# Patient Record
Sex: FEMALE | Race: WHITE | NOT HISPANIC OR LATINO | Employment: UNEMPLOYED | URBAN - NONMETROPOLITAN AREA
[De-identification: names, ages, dates, MRNs, and addresses within clinical notes are randomized per-mention and may not be internally consistent; named-entity substitution may affect disease eponyms.]

---

## 2023-01-11 ENCOUNTER — DOCUMENTATION (OUTPATIENT)
Dept: FAMILY MEDICINE CLINIC | Facility: CLINIC | Age: 83
End: 2023-01-11
Payer: MEDICARE

## 2023-01-11 RX ORDER — TRAMADOL HYDROCHLORIDE 50 MG/1
50 TABLET ORAL EVERY 6 HOURS PRN
COMMUNITY
End: 2023-01-11 | Stop reason: SDUPTHER

## 2023-01-11 RX ORDER — TRAMADOL HYDROCHLORIDE 50 MG/1
50 TABLET ORAL EVERY 6 HOURS PRN
Qty: 60 TABLET | Refills: 0 | Status: SHIPPED | OUTPATIENT
Start: 2023-01-11

## 2023-01-11 NOTE — TELEPHONE ENCOUNTER
(NEW ADMIT)    JANICE REQUESTING MED REFILL FOR TRAMADOL 50 MG.    DIRECTIONS: TRAMADOL 50 MG 1 TAB PO Q 6 HRS PRN.

## 2023-01-12 ENCOUNTER — NURSING HOME (OUTPATIENT)
Dept: INTERNAL MEDICINE | Facility: CLINIC | Age: 83
End: 2023-01-12
Payer: MEDICARE

## 2023-01-12 VITALS
TEMPERATURE: 97 F | DIASTOLIC BLOOD PRESSURE: 72 MMHG | OXYGEN SATURATION: 96 % | RESPIRATION RATE: 20 BRPM | WEIGHT: 217 LBS | SYSTOLIC BLOOD PRESSURE: 125 MMHG | HEART RATE: 90 BPM

## 2023-01-12 DIAGNOSIS — E03.9 ACQUIRED HYPOTHYROIDISM: ICD-10-CM

## 2023-01-12 DIAGNOSIS — R21 GROIN RASH: ICD-10-CM

## 2023-01-12 DIAGNOSIS — S99.912D INJURY OF LEFT ANKLE, SUBSEQUENT ENCOUNTER: ICD-10-CM

## 2023-01-12 DIAGNOSIS — I10 ESSENTIAL HYPERTENSION: ICD-10-CM

## 2023-01-12 DIAGNOSIS — L03.115 CELLULITIS OF RIGHT LOWER EXTREMITY: Primary | ICD-10-CM

## 2023-01-12 DIAGNOSIS — N18.31 STAGE 3A CHRONIC KIDNEY DISEASE: ICD-10-CM

## 2023-01-12 DIAGNOSIS — N30.00 ACUTE CYSTITIS WITHOUT HEMATURIA: ICD-10-CM

## 2023-01-12 PROCEDURE — 99306 1ST NF CARE HIGH MDM 50: CPT | Performed by: INTERNAL MEDICINE

## 2023-01-12 NOTE — LETTER
Nursing Home History and Physical       Rodriguez Umaña DO  [address] [place] [address] Phone: [phone]  Fax: [phone]     PATIENT NAME: Marin Aguirre                                                                          YOB: 1940           DATE OF SERVICE: 01/12/2023  FACILITY:  [facility]    CHIEF COMPLAINT:  Nursing facility admission      HISTORY OF PRESENT ILLNESS:   Patient is a pleasant 82-year-old Syriac female who had been living by herself prior to recent hospitalization.  She has a history of obesity, COPD, syncope, type 2 diabetes mellitus, and hypertension and also suffered a fall resulting in left ankle fracture.  Ankle has been placed in a splint and patient has had limited ability to transfer and move.  Upon admission to the hospital, patient was found to have elevated creatinine, mildly elevated lactic acid, and low magnesium.  She was treated with supportive care and also treated for UTI.    The patient has transferred to this facility for strengthening and rehab as she was unable to care for herself at home.  Patient shared significant concerns about groin pain and raw areas around her vagina.  Every time she urinates, she reports having significant burning pain and worsening of her wound as the area remains wet.  Yesterday, she was started on prescription of antifungal cream and Diflucan for fungal infection.  Lidocaine topical was also started for pain control.  Patient also has increased anxiety and depression for which Lexapro was started yesterday.    PAST MEDICAL & SURGICAL HISTORY:   Past Medical History:   Diagnosis Date   • KRISTIAN (acute kidney injury) (HCC)    • Atrial fibrillation (HCC)    • Cellulitis     RIGHT LOWER EXT   • CKD (chronic kidney disease)    • COPD (chronic obstructive pulmonary disease) ([facility])    • Diabetes mellitus (HCC)    • Elevated lactic acid level    • Frequent falls    • Hypertension    • Hypothyroidism    • Obesity    • Sepsis  ([facility])    • Syncope    • Tibial fracture       Past Surgical History:   Procedure Laterality Date   • PACEMAKER IMPLANTATION N/A          MEDICATIONS:  I have reviewed and reconciled the patients medication list in the patients chart at the skilled nursing facility on 01/12/2023.      ALLERGIES:  Allergies   Allergen Reactions   • Midazolam Unknown - High Severity   • Oxycodone Unknown - High Severity         SOCIAL HISTORY:  Social History     Socioeconomic History   • Marital status:    Tobacco Use   • Smoking status: Never   • Smokeless tobacco: Never   Vaping Use   • Vaping Use: Never used   Substance and Sexual Activity   • Alcohol use: Never   • Drug use: Never   • Sexual activity: Defer       FAMILY HISTORY:  Family History   Problem Relation Age of Onset   • COPD Mother         REVIEW OF SYSTEMS:  Review of Systems   Constitutional: Positive for fatigue. Negative for chills and fever.   HENT: Negative for congestion, ear pain, rhinorrhea, sinus pressure and sore throat.    Eyes: Negative for visual disturbance.   Respiratory: Negative for cough, chest tightness, shortness of breath and wheezing.    Cardiovascular: Negative for chest pain, palpitations and leg swelling.   Gastrointestinal: Negative for abdominal pain, blood in stool, constipation, diarrhea, nausea and vomiting.   Endocrine: Negative for polydipsia and polyuria.   Genitourinary: Positive for difficulty urinating, dysuria and genital sores. Negative for hematuria.   Musculoskeletal: Negative for arthralgias and back pain.   Skin: Negative for rash.   Neurological: Negative for dizziness, light-headedness, numbness and headaches.   Psychiatric/Behavioral: Negative for dysphoric mood and sleep disturbance. The patient is not nervous/anxious.          PHYSICAL EXAMINATION:   VITAL SIGNS: /72   Pulse 90   Temp 97 °F (36.1 °C)   Resp 20   Wt 98.4 kg (217 lb)   SpO2 96%     Physical Exam  Vitals and nursing note reviewed.    Constitutional:       General: She is in acute distress (mild due to persistent groin pain).      Appearance: Normal appearance. She is well-developed. She is obese.   HENT:      Head: Normocephalic and atraumatic.      Nose: Nose normal.      Mouth/Throat:      Mouth: Mucous membranes are moist.      Pharynx: No oropharyngeal exudate.   Eyes:      General: No scleral icterus.     Conjunctiva/sclera: Conjunctivae normal.      Pupils: Pupils are equal, round, and reactive to light.   Neck:      Thyroid: No thyromegaly.   Cardiovascular:      Rate and Rhythm: Normal rate and regular rhythm.      Heart sounds: Normal heart sounds. No murmur heard.    No friction rub. No gallop.   Pulmonary:      Effort: Pulmonary effort is normal. No respiratory distress.      Breath sounds: Normal breath sounds. No wheezing.   Abdominal:      General: Bowel sounds are normal. There is no distension.      Palpations: Abdomen is soft.      Tenderness: There is no abdominal tenderness.   Musculoskeletal:         General: No deformity or signs of injury.      Cervical back: Normal range of motion and neck supple.      Comments: Left lower extremity in splint   Lymphadenopathy:      Cervical: No cervical adenopathy.   Skin:     General: Skin is warm and dry.      Findings: No rash.      Comments: Blistering red and raw lesions in the groin   Neurological:      Mental Status: She is alert and oriented to person, place, and time.   Psychiatric:         Mood and Affect: Mood normal.         Behavior: Behavior normal.         RECORDS REVIEW:   Discharge Summary from [facility] 1/10/2023    ASSESSMENT   Diagnoses and all orders for this visit:    1. Cellulitis of right lower extremity (Primary)    2. Injury of left ankle, subsequent encounter    3. Acute cystitis without hematuria    4. Groin rash    5. Essential hypertension    6. Stage 3a chronic kidney disease (HCC)    7. Acquired hypothyroidism        PLAN  Candidal groin  infection/wounds/sores to groin  - I agree with recent plans for antifungal topical as well as oral Diflucan.  - Due to significant pain with urination, I recommended the patient keep a Patino catheter in place for about 3 to 5 days to allow for time to heal and keep the groin dry as she is incontinent.    Right lower extremity cellulitis  - Completed antibiotics during hospitalization.    Anxiety/depression  - Recently started on Lexapro.  Appears to be handling the medication so far but it is too early to make any adjustments.    Left ankle injury  - Currently nonweightbearing to the extremity.  Physical therapy continues to work with the patient for mobility.    CKD stage IIIa  - Renal function remained stable at this time.        [x]  Discussed Patient in detail with nursing/staff, addressed all needs today.     [x]  Plan of Care Reviewed   [x]  PT/OT Reviewed   [x]  Order Changes  []  Discharge Plans Reviewed  [x]  Advance Directive on file with Nursing Home.   [x]  POA on file with Nursing Home.    [x]  Code Status listed and reviewed.       Rodriguez Umaña DO.  1/15/2023      **Part of this note may be an electronic transcription/translation of spoken language to printed text using the Dragon Dictation System.**

## 2023-01-15 NOTE — PROGRESS NOTES
Nursing Home History and Physical       Rodriguez Umaña DO  [address] [place] [address] Phone: [phone]  Fax: [phone]     PATIENT NAME: Marin Aguirre                                                                          YOB: 1940           DATE OF SERVICE: 01/12/2023  FACILITY:  [facility]    CHIEF COMPLAINT:  Nursing facility admission      HISTORY OF PRESENT ILLNESS:   Patient is a pleasant 82-year-old Polish female who had been living by herself prior to recent hospitalization.  She has a history of obesity, COPD, syncope, type 2 diabetes mellitus, and hypertension and also suffered a fall resulting in left ankle fracture.  Ankle has been placed in a splint and patient has had limited ability to transfer and move.  Upon admission to the hospital, patient was found to have elevated creatinine, mildly elevated lactic acid, and low magnesium.  She was treated with supportive care and also treated for UTI.    The patient has transferred to this facility for strengthening and rehab as she was unable to care for herself at home.  Patient shared significant concerns about groin pain and raw areas around her vagina.  Every time she urinates, she reports having significant burning pain and worsening of her wound as the area remains wet.  Yesterday, she was started on prescription of antifungal cream and Diflucan for fungal infection.  Lidocaine topical was also started for pain control.  Patient also has increased anxiety and depression for which Lexapro was started yesterday.    PAST MEDICAL & SURGICAL HISTORY:   Past Medical History:   Diagnosis Date   • KRISTIAN (acute kidney injury) (HCC)    • Atrial fibrillation (HCC)    • Cellulitis     RIGHT LOWER EXT   • CKD (chronic kidney disease)    • COPD (chronic obstructive pulmonary disease) ([facility])    • Diabetes mellitus (HCC)    • Elevated lactic acid level    • Frequent falls    • Hypertension    • Hypothyroidism    • Obesity    • Sepsis  ([facility])    • Syncope    • Tibial fracture       Past Surgical History:   Procedure Laterality Date   • PACEMAKER IMPLANTATION N/A          MEDICATIONS:  I have reviewed and reconciled the patients medication list in the patients chart at the skilled nursing facility on 01/12/2023.      ALLERGIES:  Allergies   Allergen Reactions   • Midazolam Unknown - High Severity   • Oxycodone Unknown - High Severity         SOCIAL HISTORY:  Social History     Socioeconomic History   • Marital status:    Tobacco Use   • Smoking status: Never   • Smokeless tobacco: Never   Vaping Use   • Vaping Use: Never used   Substance and Sexual Activity   • Alcohol use: Never   • Drug use: Never   • Sexual activity: Defer       FAMILY HISTORY:  Family History   Problem Relation Age of Onset   • COPD Mother         REVIEW OF SYSTEMS:  Review of Systems   Constitutional: Positive for fatigue. Negative for chills and fever.   HENT: Negative for congestion, ear pain, rhinorrhea, sinus pressure and sore throat.    Eyes: Negative for visual disturbance.   Respiratory: Negative for cough, chest tightness, shortness of breath and wheezing.    Cardiovascular: Negative for chest pain, palpitations and leg swelling.   Gastrointestinal: Negative for abdominal pain, blood in stool, constipation, diarrhea, nausea and vomiting.   Endocrine: Negative for polydipsia and polyuria.   Genitourinary: Positive for difficulty urinating, dysuria and genital sores. Negative for hematuria.   Musculoskeletal: Negative for arthralgias and back pain.   Skin: Negative for rash.   Neurological: Negative for dizziness, light-headedness, numbness and headaches.   Psychiatric/Behavioral: Negative for dysphoric mood and sleep disturbance. The patient is not nervous/anxious.          PHYSICAL EXAMINATION:   VITAL SIGNS: /72   Pulse 90   Temp 97 °F (36.1 °C)   Resp 20   Wt 98.4 kg (217 lb)   SpO2 96%     Physical Exam  Vitals and nursing note reviewed.    Constitutional:       General: She is in acute distress (mild due to persistent groin pain).      Appearance: Normal appearance. She is well-developed. She is obese.   HENT:      Head: Normocephalic and atraumatic.      Nose: Nose normal.      Mouth/Throat:      Mouth: Mucous membranes are moist.      Pharynx: No oropharyngeal exudate.   Eyes:      General: No scleral icterus.     Conjunctiva/sclera: Conjunctivae normal.      Pupils: Pupils are equal, round, and reactive to light.   Neck:      Thyroid: No thyromegaly.   Cardiovascular:      Rate and Rhythm: Normal rate and regular rhythm.      Heart sounds: Normal heart sounds. No murmur heard.    No friction rub. No gallop.   Pulmonary:      Effort: Pulmonary effort is normal. No respiratory distress.      Breath sounds: Normal breath sounds. No wheezing.   Abdominal:      General: Bowel sounds are normal. There is no distension.      Palpations: Abdomen is soft.      Tenderness: There is no abdominal tenderness.   Musculoskeletal:         General: No deformity or signs of injury.      Cervical back: Normal range of motion and neck supple.      Comments: Left lower extremity in splint   Lymphadenopathy:      Cervical: No cervical adenopathy.   Skin:     General: Skin is warm and dry.      Findings: No rash.      Comments: Blistering red and raw lesions in the groin   Neurological:      Mental Status: She is alert and oriented to person, place, and time.   Psychiatric:         Mood and Affect: Mood normal.         Behavior: Behavior normal.         RECORDS REVIEW:   Discharge Summary from [facility] 1/10/2023    ASSESSMENT   Diagnoses and all orders for this visit:    1. Cellulitis of right lower extremity (Primary)    2. Injury of left ankle, subsequent encounter    3. Acute cystitis without hematuria    4. Groin rash    5. Essential hypertension    6. Stage 3a chronic kidney disease (HCC)    7. Acquired hypothyroidism        PLAN  Candidal groin  infection/wounds/sores to groin  - I agree with recent plans for antifungal topical as well as oral Diflucan.  - Due to significant pain with urination, I recommended the patient keep a Patino catheter in place for about 3 to 5 days to allow for time to heal and keep the groin dry as she is incontinent.    Right lower extremity cellulitis  - Completed antibiotics during hospitalization.    Anxiety/depression  - Recently started on Lexapro.  Appears to be handling the medication so far but it is too early to make any adjustments.    Left ankle injury  - Currently nonweightbearing to the extremity.  Physical therapy continues to work with the patient for mobility.    CKD stage IIIa  - Renal function remained stable at this time.        [x]  Discussed Patient in detail with nursing/staff, addressed all needs today.     [x]  Plan of Care Reviewed   [x]  PT/OT Reviewed   [x]  Order Changes  []  Discharge Plans Reviewed  [x]  Advance Directive on file with Nursing Home.   [x]  POA on file with Nursing Home.    [x]  Code Status listed and reviewed.       Rodriguez Umaña DO.  1/15/2023      **Part of this note may be an electronic transcription/translation of spoken language to printed text using the Dragon Dictation System.**